# Patient Record
Sex: MALE | Race: WHITE | Employment: OTHER | URBAN - METROPOLITAN AREA
[De-identification: names, ages, dates, MRNs, and addresses within clinical notes are randomized per-mention and may not be internally consistent; named-entity substitution may affect disease eponyms.]

---

## 2018-12-27 ENCOUNTER — HOSPITAL ENCOUNTER (OUTPATIENT)
Dept: LAB | Age: 78
Discharge: HOME | End: 2018-12-27

## 2021-06-04 ENCOUNTER — HOSPITAL ENCOUNTER (EMERGENCY)
Age: 81
Discharge: HOME | End: 2021-06-04
Attending: EMERGENCY MEDICINE
Payer: MEDICARE

## 2021-06-04 ENCOUNTER — APPOINTMENT (OUTPATIENT)
Dept: CT IMAGING | Age: 81
End: 2021-06-04
Attending: EMERGENCY MEDICINE
Payer: MEDICARE

## 2021-06-04 VITALS
RESPIRATION RATE: 15 BRPM | DIASTOLIC BLOOD PRESSURE: 69 MMHG | HEART RATE: 68 BPM | WEIGHT: 174.16 LBS | TEMPERATURE: 97.8 F | HEIGHT: 69 IN | SYSTOLIC BLOOD PRESSURE: 123 MMHG | BODY MASS INDEX: 25.8 KG/M2 | OXYGEN SATURATION: 91 %

## 2021-06-04 DIAGNOSIS — N20.1 URETEROLITHIASIS: Primary | ICD-10-CM

## 2021-06-04 DIAGNOSIS — N13.2 HYDRONEPHROSIS WITH URINARY OBSTRUCTION DUE TO URETERAL CALCULUS: ICD-10-CM

## 2021-06-04 PROCEDURE — 99283 EMERGENCY DEPT VISIT LOW MDM: CPT

## 2021-06-04 PROCEDURE — 74176 CT ABD & PELVIS W/O CONTRAST: CPT

## 2021-06-04 RX ORDER — TAMSULOSIN HYDROCHLORIDE 0.4 MG/1
0.4 CAPSULE ORAL DAILY
Qty: 15 CAPSULE | Refills: 0 | Status: SHIPPED | OUTPATIENT
Start: 2021-06-04 | End: 2021-06-19

## 2021-06-04 RX ORDER — NAPROXEN 500 MG/1
500 TABLET ORAL 2 TIMES DAILY WITH MEALS
Qty: 20 TABLET | Refills: 0 | Status: SHIPPED | OUTPATIENT
Start: 2021-06-04 | End: 2021-06-14

## 2021-06-04 NOTE — ED NOTES
Dr. Kelin Noriega reviewed discharge instructions with the patient. The patient verbalized understanding. Patient ambulated out of the emergency department escorted by family. Patient is free of pain and in no apparent distress.

## 2021-06-04 NOTE — DISCHARGE INSTRUCTIONS
Thank you for allowing us to provide you with medical care today. We realize that you have many choices for your emergency care needs. We thank you for choosing [facility]. Please choose us in the future for any continued health care needs. We hope we addressed all of your medical concerns. We strive to provide excellent quality care in the Emergency Department. Anything less than excellent does not meet our expectations. The exam and treatment you received in the Emergency Department were for an emergent problem and are not intended as complete care. It is important that you follow up with a doctor, nurse practitioner, or physician's assistant for ongoing care. If your symptoms worsen or you do not improve as expected and you are unable to reach your usual health care provider, you should return to the Emergency Department. We are available 24 hours a day. Take this sheet with you when you go to your follow-up visit. If you have any problem arranging the follow-up visit, contact the Emergency Department immediately. Make an appointment your family doctor for follow up of this visit. Return to the ER if you are unable to be seen in a timely manner.

## 2021-06-04 NOTE — ED TRIAGE NOTES
Patient presents to the emergency department reporting he was seen at [facility] and diagnosed with a kidney stone. Patient reports symptoms are similar to previous kidney stones. Patient reports he had right sided flank pain. Patient states they took an x-ray, but the x-ray was not clear due to stool, and was told to come to the ED for a CT. Patient is currently free of pain because he had pain and nausea medication at [facility].  [facility] sent results of urine and blood work.

## 2021-06-04 NOTE — ED PROVIDER NOTES
44-year-old gentleman with history of kidney stones, hypertension presents to the emergency department today with chief complaint of right flank pain. He was seen at [facility] earlier today for index kidney stone pain and given Toradol and Zofran with resolution of his symptoms. He was advised to come to the emergency department as they were unable to see kidney stone on x-ray. He denies any dysuria or fevers. He presents with lab work from [facility] which is remarkable for creatinine of 1.3 with UA with hematuria without signs of infection. The history is provided by the patient and medical records. Flank Pain   This is a new problem. Episode onset: Today. The problem has been resolved. The problem occurs constantly. Patient reports not work related injury. The pain is present in the right side. The pain is moderate. Pertinent negatives include no chest pain, no fever, no headaches, no abdominal pain, no dysuria and no weakness. He has tried NSAIDs for the symptoms. Past Medical History:   Diagnosis Date    Arthritis     Hypertension     Kidney stones        Past Surgical History:   Procedure Laterality Date    HX MITRAL VALVE REPLACEMENT           No family history on file.     Social History     Socioeconomic History    Marital status: Not on file     Spouse name: Not on file    Number of children: Not on file    Years of education: Not on file    Highest education level: Not on file   Occupational History    Not on file   Tobacco Use    Smoking status: Former Smoker    Smokeless tobacco: Never Used   Substance and Sexual Activity    Alcohol use: Not on file    Drug use: Not on file    Sexual activity: Not on file   Other Topics Concern    Not on file   Social History Narrative    Not on file     Social Determinants of Health     Financial Resource Strain:     Difficulty of Paying Living Expenses:    Food Insecurity:     Worried About Running Out of Food in the Last Year:  Ran Out of Food in the Last Year:    Transportation Needs:     Lack of Transportation (Medical):  Lack of Transportation (Non-Medical):    Physical Activity:     Days of Exercise per Week:     Minutes of Exercise per Session:    Stress:     Feeling of Stress :    Social Connections:     Frequency of Communication with Friends and Family:     Frequency of Social Gatherings with Friends and Family:     Attends Islam Services:     Active Member of Clubs or Organizations:     Attends Club or Organization Meetings:     Marital Status:    Intimate Partner Violence:     Fear of Current or Ex-Partner:     Emotionally Abused:     Physically Abused:     Sexually Abused: ALLERGIES: Sulfa (sulfonamide antibiotics)    Review of Systems   Constitutional: Negative for fatigue and fever. HENT: Negative for sneezing and sore throat. Respiratory: Negative for cough and shortness of breath. Cardiovascular: Negative for chest pain and leg swelling. Gastrointestinal: Negative for abdominal pain, diarrhea, nausea and vomiting. Genitourinary: Positive for flank pain. Negative for difficulty urinating and dysuria. Musculoskeletal: Negative for arthralgias and myalgias. Skin: Negative for color change and rash. Neurological: Negative for weakness and headaches. Psychiatric/Behavioral: Negative for agitation and behavioral problems. Vitals:    06/04/21 1637   BP: 134/82   Pulse: 68   Resp: 15   Temp: 97.8 °F (36.6 °C)   SpO2: 94%   Weight: 79 kg (174 lb 2.6 oz)   Height: 5' 9\" (1.753 m)            Physical Exam  Vitals and nursing note reviewed. Constitutional:       General: He is not in acute distress. Appearance: Normal appearance. He is well-developed. He is not ill-appearing, toxic-appearing or diaphoretic. HENT:      Head: Normocephalic and atraumatic. Nose: Nose normal.      Mouth/Throat:      Mouth: Mucous membranes are moist.      Pharynx: Oropharynx is clear. Eyes:      Extraocular Movements: Extraocular movements intact. Conjunctiva/sclera: Conjunctivae normal.      Pupils: Pupils are equal, round, and reactive to light. Cardiovascular:      Rate and Rhythm: Normal rate and regular rhythm. Pulses: Normal pulses. Heart sounds: No murmur heard. Pulmonary:      Effort: Pulmonary effort is normal. No respiratory distress. Breath sounds: Normal breath sounds. No wheezing. Chest:      Chest wall: No mass or tenderness. Abdominal:      General: There is no distension. Palpations: Abdomen is soft. Tenderness: There is no abdominal tenderness. There is no guarding or rebound. Musculoskeletal:         General: No swelling, tenderness, deformity or signs of injury. Normal range of motion. Cervical back: Normal range of motion and neck supple. No rigidity. No muscular tenderness. Right lower leg: No tenderness. No edema. Left lower leg: No tenderness. No edema. Skin:     General: Skin is warm and dry. Capillary Refill: Capillary refill takes less than 2 seconds. Neurological:      General: No focal deficit present. Mental Status: He is alert and oriented to person, place, and time. Psychiatric:         Mood and Affect: Mood normal.         Behavior: Behavior normal.          MDM  Number of Diagnoses or Management Options  Diagnosis management comments: 78-year-old gentleman presents as above with kidney stone. Labs from [facility] are reassuring. Plan to discharge with anti-inflammatory, Flomax, follow-up with [facility] urology as scheduled on Monday.        Amount and/or Complexity of Data Reviewed  Tests in the radiology section of CPT®: reviewed           Procedures

## 2023-10-04 ENCOUNTER — HOSPITAL ENCOUNTER (EMERGENCY)
Facility: HOSPITAL | Age: 83
Discharge: HOME | End: 2023-10-04
Attending: STUDENT IN AN ORGANIZED HEALTH CARE EDUCATION/TRAINING PROGRAM
Payer: MEDICARE

## 2023-10-04 ENCOUNTER — APPOINTMENT (OUTPATIENT)
Facility: HOSPITAL | Age: 83
End: 2023-10-04
Payer: MEDICARE

## 2023-10-04 VITALS
DIASTOLIC BLOOD PRESSURE: 78 MMHG | WEIGHT: 176.81 LBS | HEART RATE: 72 BPM | OXYGEN SATURATION: 92 % | TEMPERATURE: 98.6 F | SYSTOLIC BLOOD PRESSURE: 135 MMHG | RESPIRATION RATE: 14 BRPM | BODY MASS INDEX: 26.11 KG/M2

## 2023-10-04 DIAGNOSIS — R30.0 DYSURIA: Primary | ICD-10-CM

## 2023-10-04 DIAGNOSIS — K57.92 DIVERTICULITIS: ICD-10-CM

## 2023-10-04 LAB
ALBUMIN SERPL-MCNC: 3.6 G/DL (ref 3.5–5)
ALBUMIN/GLOB SERPL: 1 (ref 1.1–2.2)
ALP SERPL-CCNC: 59 U/L (ref 45–117)
ALT SERPL-CCNC: 30 U/L (ref 12–78)
ANION GAP SERPL CALC-SCNC: 11 MMOL/L (ref 5–15)
APPEARANCE UR: CLEAR
AST SERPL-CCNC: 16 U/L (ref 15–37)
BACTERIA URNS QL MICRO: NEGATIVE /HPF
BASOPHILS # BLD: 0 K/UL (ref 0–0.1)
BASOPHILS NFR BLD: 0 % (ref 0–1)
BILIRUB SERPL-MCNC: 1.4 MG/DL (ref 0.2–1)
BILIRUB UR QL: NEGATIVE
BUN SERPL-MCNC: 14 MG/DL (ref 6–20)
BUN/CREAT SERPL: 16 (ref 12–20)
CALCIUM SERPL-MCNC: 9.2 MG/DL (ref 8.5–10.1)
CHLORIDE SERPL-SCNC: 104 MMOL/L (ref 97–108)
CO2 SERPL-SCNC: 25 MMOL/L (ref 21–32)
COLOR UR: ABNORMAL
CREAT SERPL-MCNC: 0.88 MG/DL (ref 0.7–1.3)
DIFFERENTIAL METHOD BLD: ABNORMAL
EOSINOPHIL # BLD: 0.1 K/UL (ref 0–0.4)
EOSINOPHIL NFR BLD: 1 % (ref 0–7)
EPITH CASTS URNS QL MICRO: ABNORMAL /LPF
ERYTHROCYTE [DISTWIDTH] IN BLOOD BY AUTOMATED COUNT: 15.4 % (ref 11.5–14.5)
GLOBULIN SER CALC-MCNC: 3.5 G/DL (ref 2–4)
GLUCOSE SERPL-MCNC: 90 MG/DL (ref 65–100)
GLUCOSE UR STRIP.AUTO-MCNC: NEGATIVE MG/DL
HCT VFR BLD AUTO: 42.5 % (ref 36.6–50.3)
HGB BLD-MCNC: 14.2 G/DL (ref 12.1–17)
HGB UR QL STRIP: ABNORMAL
IMM GRANULOCYTES # BLD AUTO: 0 K/UL
IMM GRANULOCYTES NFR BLD AUTO: 0 %
KETONES UR QL STRIP.AUTO: 40 MG/DL
LEUKOCYTE ESTERASE UR QL STRIP.AUTO: NEGATIVE
LIPASE SERPL-CCNC: 25 U/L (ref 13–75)
LYMPHOCYTES # BLD: 0.8 K/UL (ref 0.8–3.5)
LYMPHOCYTES NFR BLD: 9 % (ref 12–49)
MCH RBC QN AUTO: 32.1 PG (ref 26–34)
MCHC RBC AUTO-ENTMCNC: 33.4 G/DL (ref 30–36.5)
MCV RBC AUTO: 96.2 FL (ref 80–99)
MONOCYTES # BLD: 0.8 K/UL (ref 0–1)
MONOCYTES NFR BLD: 9 % (ref 5–13)
NEUTS SEG # BLD: 6.8 K/UL (ref 1.8–8)
NEUTS SEG NFR BLD: 81 % (ref 32–75)
NITRITE UR QL STRIP.AUTO: NEGATIVE
NRBC # BLD: 0 K/UL (ref 0–0.01)
NRBC BLD-RTO: 0 PER 100 WBC
PH UR STRIP: 6.5 (ref 5–8)
PLATELET # BLD AUTO: 177 K/UL (ref 150–400)
PMV BLD AUTO: 9.5 FL (ref 8.9–12.9)
POTASSIUM SERPL-SCNC: 4 MMOL/L (ref 3.5–5.1)
PROT SERPL-MCNC: 7.1 G/DL (ref 6.4–8.2)
PROT UR STRIP-MCNC: NEGATIVE MG/DL
RBC # BLD AUTO: 4.42 M/UL (ref 4.1–5.7)
RBC #/AREA URNS HPF: ABNORMAL /HPF (ref 0–5)
RBC MORPH BLD: ABNORMAL
SODIUM SERPL-SCNC: 140 MMOL/L (ref 136–145)
SP GR UR REFRACTOMETRY: 1.01 (ref 1–1.03)
SPECIMEN HOLD: NORMAL
UROBILINOGEN UR QL STRIP.AUTO: 0.2 EU/DL (ref 0.2–1)
WBC # BLD AUTO: 8.5 K/UL (ref 4.1–11.1)
WBC URNS QL MICRO: ABNORMAL /HPF (ref 0–4)

## 2023-10-04 PROCEDURE — 80053 COMPREHEN METABOLIC PANEL: CPT

## 2023-10-04 PROCEDURE — 85025 COMPLETE CBC W/AUTO DIFF WBC: CPT

## 2023-10-04 PROCEDURE — 81001 URINALYSIS AUTO W/SCOPE: CPT

## 2023-10-04 PROCEDURE — 99284 EMERGENCY DEPT VISIT MOD MDM: CPT

## 2023-10-04 PROCEDURE — 36415 COLL VENOUS BLD VENIPUNCTURE: CPT

## 2023-10-04 PROCEDURE — 83690 ASSAY OF LIPASE: CPT

## 2023-10-04 PROCEDURE — 74176 CT ABD & PELVIS W/O CONTRAST: CPT

## 2023-10-04 RX ORDER — SILDENAFIL 25 MG/1
25 TABLET, FILM COATED ORAL DAILY PRN
COMMUNITY
Start: 2022-12-02

## 2023-10-04 RX ORDER — OCTISALATE, AVOBENZONE, HOMOSALATE, AND OCTOCRYLENE 29.4; 29.4; 49; 25.48 MG/ML; MG/ML; MG/ML; MG/ML
LOTION TOPICAL
COMMUNITY

## 2023-10-04 RX ORDER — ACETAMINOPHEN 325 MG/1
TABLET ORAL
COMMUNITY

## 2023-10-04 RX ORDER — LATANOPROST 50 UG/ML
SOLUTION/ DROPS OPHTHALMIC
COMMUNITY
Start: 2023-07-21

## 2023-10-04 RX ORDER — ASPIRIN 81 MG/1
TABLET ORAL
COMMUNITY

## 2023-10-04 RX ORDER — PRAVASTATIN SODIUM 40 MG
40 TABLET ORAL NIGHTLY
Qty: 30 TABLET | Refills: 11 | COMMUNITY
Start: 2023-07-27 | End: 2024-07-26

## 2023-10-04 RX ORDER — CETIRIZINE HYDROCHLORIDE 10 MG/1
TABLET ORAL
COMMUNITY

## 2023-10-04 RX ORDER — METHYLCELLULOSE 2 G/19G
POWDER, FOR SOLUTION ORAL
COMMUNITY

## 2023-10-04 RX ORDER — MULTIVITAMIN WITH IRON
TABLET ORAL
COMMUNITY

## 2023-10-04 RX ORDER — AMLODIPINE BESYLATE 5 MG/1
5 TABLET ORAL DAILY
COMMUNITY
Start: 2023-02-27

## 2023-10-04 RX ORDER — ALLOPURINOL 300 MG/1
300 TABLET ORAL DAILY
Qty: 30 TABLET | Refills: 11 | COMMUNITY
Start: 2022-12-28 | End: 2023-12-28

## 2023-10-04 RX ORDER — AMOXICILLIN AND CLAVULANATE POTASSIUM 875; 125 MG/1; MG/1
1 TABLET, FILM COATED ORAL 2 TIMES DAILY
Qty: 20 TABLET | Refills: 0 | Status: SHIPPED | OUTPATIENT
Start: 2023-10-04 | End: 2023-10-14

## 2023-10-04 RX ORDER — TERAZOSIN 5 MG/1
5 CAPSULE ORAL NIGHTLY
Qty: 30 CAPSULE | Refills: 11 | COMMUNITY
Start: 2022-12-28 | End: 2023-12-28

## 2023-10-04 ASSESSMENT — PAIN SCALES - GENERAL: PAINLEVEL_OUTOF10: 3

## 2023-10-04 ASSESSMENT — PAIN - FUNCTIONAL ASSESSMENT: PAIN_FUNCTIONAL_ASSESSMENT: 0-10

## 2023-10-04 ASSESSMENT — PAIN DESCRIPTION - LOCATION: LOCATION: PENIS

## 2023-10-04 ASSESSMENT — PAIN DESCRIPTION - DESCRIPTORS: DESCRIPTORS: BURNING

## 2023-10-04 NOTE — ED NOTES

## 2023-10-04 NOTE — ED TRIAGE NOTES
81year old male pt comes to the ED via POV for a CC Dysuria and frequency. Pt states that he got up multiple times in the night with pain during urination and after urination. Pt has a history of UTI's and he believes its one. Pt is A&Ox4. Patient in bed in semi fowlers position. Pt educated on call bell and call bell in reach. Nurse told patient to not get up without assistance and use call bell. Wife at the bedside.

## 2024-03-13 ENCOUNTER — APPOINTMENT (OUTPATIENT)
Facility: HOSPITAL | Age: 84
End: 2024-03-13
Payer: MEDICARE

## 2024-03-13 ENCOUNTER — HOSPITAL ENCOUNTER (INPATIENT)
Facility: HOSPITAL | Age: 84
LOS: 2 days | Discharge: HOME | End: 2024-03-15
Attending: STUDENT IN AN ORGANIZED HEALTH CARE EDUCATION/TRAINING PROGRAM | Admitting: STUDENT IN AN ORGANIZED HEALTH CARE EDUCATION/TRAINING PROGRAM
Payer: MEDICARE

## 2024-03-13 DIAGNOSIS — R55 SYNCOPE: ICD-10-CM

## 2024-03-13 DIAGNOSIS — R55 SYNCOPE AND COLLAPSE: Primary | ICD-10-CM

## 2024-03-13 LAB
ALBUMIN SERPL-MCNC: 3.5 G/DL (ref 3.5–5)
ALBUMIN/GLOB SERPL: 0.9 (ref 1.1–2.2)
ALP SERPL-CCNC: 66 U/L (ref 45–117)
ALT SERPL-CCNC: 32 U/L (ref 12–78)
ANION GAP SERPL CALC-SCNC: 11 MMOL/L (ref 5–15)
AST SERPL-CCNC: 26 U/L (ref 15–37)
BASOPHILS # BLD: 0 K/UL (ref 0–0.1)
BASOPHILS NFR BLD: 0 % (ref 0–1)
BILIRUB SERPL-MCNC: 1 MG/DL (ref 0.2–1)
BUN SERPL-MCNC: 18 MG/DL (ref 6–20)
BUN/CREAT SERPL: 23 (ref 12–20)
CALCIUM SERPL-MCNC: 8.9 MG/DL (ref 8.5–10.1)
CHLORIDE SERPL-SCNC: 104 MMOL/L (ref 97–108)
CO2 SERPL-SCNC: 24 MMOL/L (ref 21–32)
CREAT SERPL-MCNC: 0.8 MG/DL (ref 0.7–1.3)
DIFFERENTIAL METHOD BLD: ABNORMAL
EKG ATRIAL RATE: 46 BPM
EKG DIAGNOSIS: NORMAL
EKG Q-T INTERVAL: 398 MS
EKG QRS DURATION: 80 MS
EKG QTC CALCULATION (BAZETT): 403 MS
EKG R AXIS: 13 DEGREES
EKG T AXIS: 32 DEGREES
EKG VENTRICULAR RATE: 62 BPM
EOSINOPHIL # BLD: 0.2 K/UL (ref 0–0.4)
EOSINOPHIL NFR BLD: 3 % (ref 0–7)
ERYTHROCYTE [DISTWIDTH] IN BLOOD BY AUTOMATED COUNT: 15.4 % (ref 11.5–14.5)
GLOBULIN SER CALC-MCNC: 3.7 G/DL (ref 2–4)
GLUCOSE SERPL-MCNC: 92 MG/DL (ref 65–100)
HCT VFR BLD AUTO: 44.7 % (ref 36.6–50.3)
HGB BLD-MCNC: 15 G/DL (ref 12.1–17)
IMM GRANULOCYTES # BLD AUTO: 0 K/UL (ref 0–0.04)
IMM GRANULOCYTES NFR BLD AUTO: 0 % (ref 0–0.5)
LYMPHOCYTES # BLD: 0.9 K/UL (ref 0.8–3.5)
LYMPHOCYTES NFR BLD: 16 % (ref 12–49)
MAGNESIUM SERPL-MCNC: 2 MG/DL (ref 1.6–2.4)
MCH RBC QN AUTO: 31.3 PG (ref 26–34)
MCHC RBC AUTO-ENTMCNC: 33.6 G/DL (ref 30–36.5)
MCV RBC AUTO: 93.1 FL (ref 80–99)
MONOCYTES # BLD: 0.5 K/UL (ref 0–1)
MONOCYTES NFR BLD: 9 % (ref 5–13)
NEUTS SEG # BLD: 3.9 K/UL (ref 1.8–8)
NEUTS SEG NFR BLD: 72 % (ref 32–75)
NRBC # BLD: 0 K/UL (ref 0–0.01)
NRBC BLD-RTO: 0 PER 100 WBC
NT PRO BNP: 96 PG/ML (ref 0–450)
PHOSPHATE SERPL-MCNC: 3.6 MG/DL (ref 2.6–4.7)
PLATELET # BLD AUTO: 185 K/UL (ref 150–400)
PMV BLD AUTO: 9.5 FL (ref 8.9–12.9)
POTASSIUM SERPL-SCNC: 5 MMOL/L (ref 3.5–5.1)
PROT SERPL-MCNC: 7.2 G/DL (ref 6.4–8.2)
RBC # BLD AUTO: 4.8 M/UL (ref 4.1–5.7)
RBC MORPH BLD: ABNORMAL
SODIUM SERPL-SCNC: 139 MMOL/L (ref 136–145)
TROPONIN I SERPL HS-MCNC: 5 NG/L (ref 0–76)
TROPONIN I SERPL HS-MCNC: 6 NG/L (ref 0–76)
WBC # BLD AUTO: 5.5 K/UL (ref 4.1–11.1)

## 2024-03-13 PROCEDURE — 84484 ASSAY OF TROPONIN QUANT: CPT

## 2024-03-13 PROCEDURE — 83880 ASSAY OF NATRIURETIC PEPTIDE: CPT

## 2024-03-13 PROCEDURE — 93005 ELECTROCARDIOGRAM TRACING: CPT | Performed by: STUDENT IN AN ORGANIZED HEALTH CARE EDUCATION/TRAINING PROGRAM

## 2024-03-13 PROCEDURE — 84100 ASSAY OF PHOSPHORUS: CPT

## 2024-03-13 PROCEDURE — 70450 CT HEAD/BRAIN W/O DYE: CPT

## 2024-03-13 PROCEDURE — 83735 ASSAY OF MAGNESIUM: CPT

## 2024-03-13 PROCEDURE — 99285 EMERGENCY DEPT VISIT HI MDM: CPT

## 2024-03-13 PROCEDURE — 36415 COLL VENOUS BLD VENIPUNCTURE: CPT

## 2024-03-13 PROCEDURE — 71045 X-RAY EXAM CHEST 1 VIEW: CPT

## 2024-03-13 PROCEDURE — 85025 COMPLETE CBC W/AUTO DIFF WBC: CPT

## 2024-03-13 PROCEDURE — 93010 ELECTROCARDIOGRAM REPORT: CPT | Performed by: INTERNAL MEDICINE

## 2024-03-13 PROCEDURE — 80053 COMPREHEN METABOLIC PANEL: CPT

## 2024-03-13 PROCEDURE — 2060000000 HC ICU INTERMEDIATE R&B

## 2024-03-13 RX ORDER — DOXAZOSIN 2 MG/1
4 TABLET ORAL DAILY
Status: DISCONTINUED | OUTPATIENT
Start: 2024-03-14 | End: 2024-03-15 | Stop reason: HOSPADM

## 2024-03-13 RX ORDER — SODIUM CHLORIDE 9 MG/ML
INJECTION, SOLUTION INTRAVENOUS CONTINUOUS
Status: DISCONTINUED | OUTPATIENT
Start: 2024-03-13 | End: 2024-03-15 | Stop reason: HOSPADM

## 2024-03-13 RX ORDER — ALLOPURINOL 100 MG/1
300 TABLET ORAL DAILY
Status: DISCONTINUED | OUTPATIENT
Start: 2024-03-14 | End: 2024-03-15 | Stop reason: HOSPADM

## 2024-03-13 RX ORDER — ENOXAPARIN SODIUM 100 MG/ML
40 INJECTION SUBCUTANEOUS DAILY
Status: DISCONTINUED | OUTPATIENT
Start: 2024-03-14 | End: 2024-03-15 | Stop reason: HOSPADM

## 2024-03-13 RX ORDER — PRAVASTATIN SODIUM 20 MG
40 TABLET ORAL NIGHTLY
Status: DISCONTINUED | OUTPATIENT
Start: 2024-03-13 | End: 2024-03-15 | Stop reason: HOSPADM

## 2024-03-13 RX ORDER — SODIUM CHLORIDE 0.9 % (FLUSH) 0.9 %
5-40 SYRINGE (ML) INJECTION PRN
Status: DISCONTINUED | OUTPATIENT
Start: 2024-03-13 | End: 2024-03-15 | Stop reason: HOSPADM

## 2024-03-13 RX ORDER — ACETAMINOPHEN 650 MG/1
650 SUPPOSITORY RECTAL EVERY 6 HOURS PRN
Status: DISCONTINUED | OUTPATIENT
Start: 2024-03-13 | End: 2024-03-15 | Stop reason: HOSPADM

## 2024-03-13 RX ORDER — SODIUM CHLORIDE 9 MG/ML
INJECTION, SOLUTION INTRAVENOUS PRN
Status: DISCONTINUED | OUTPATIENT
Start: 2024-03-13 | End: 2024-03-15 | Stop reason: HOSPADM

## 2024-03-13 RX ORDER — ALLOPURINOL 300 MG/1
300 TABLET ORAL DAILY
Status: ON HOLD | COMMUNITY
End: 2024-03-15 | Stop reason: HOSPADM

## 2024-03-13 RX ORDER — ONDANSETRON 2 MG/ML
4 INJECTION INTRAMUSCULAR; INTRAVENOUS EVERY 6 HOURS PRN
Status: DISCONTINUED | OUTPATIENT
Start: 2024-03-13 | End: 2024-03-15 | Stop reason: HOSPADM

## 2024-03-13 RX ORDER — ACETAMINOPHEN 325 MG/1
650 TABLET ORAL EVERY 6 HOURS PRN
Status: DISCONTINUED | OUTPATIENT
Start: 2024-03-13 | End: 2024-03-15 | Stop reason: HOSPADM

## 2024-03-13 RX ORDER — MAGNESIUM SULFATE IN WATER 40 MG/ML
2000 INJECTION, SOLUTION INTRAVENOUS PRN
Status: DISCONTINUED | OUTPATIENT
Start: 2024-03-13 | End: 2024-03-15 | Stop reason: HOSPADM

## 2024-03-13 RX ORDER — POTASSIUM CHLORIDE 750 MG/1
40 TABLET, FILM COATED, EXTENDED RELEASE ORAL PRN
Status: DISCONTINUED | OUTPATIENT
Start: 2024-03-13 | End: 2024-03-15 | Stop reason: HOSPADM

## 2024-03-13 RX ORDER — SODIUM CHLORIDE 0.9 % (FLUSH) 0.9 %
5-40 SYRINGE (ML) INJECTION EVERY 12 HOURS SCHEDULED
Status: DISCONTINUED | OUTPATIENT
Start: 2024-03-13 | End: 2024-03-15 | Stop reason: HOSPADM

## 2024-03-13 RX ORDER — LATANOPROST 50 UG/ML
1 SOLUTION/ DROPS OPHTHALMIC NIGHTLY
Status: DISCONTINUED | OUTPATIENT
Start: 2024-03-13 | End: 2024-03-15 | Stop reason: HOSPADM

## 2024-03-13 RX ORDER — POLYETHYLENE GLYCOL 3350 17 G/17G
17 POWDER, FOR SOLUTION ORAL DAILY PRN
Status: DISCONTINUED | OUTPATIENT
Start: 2024-03-13 | End: 2024-03-15 | Stop reason: HOSPADM

## 2024-03-13 RX ORDER — ONDANSETRON 4 MG/1
4 TABLET, ORALLY DISINTEGRATING ORAL EVERY 8 HOURS PRN
Status: DISCONTINUED | OUTPATIENT
Start: 2024-03-13 | End: 2024-03-15 | Stop reason: HOSPADM

## 2024-03-13 RX ORDER — POTASSIUM CHLORIDE 7.45 MG/ML
10 INJECTION INTRAVENOUS PRN
Status: DISCONTINUED | OUTPATIENT
Start: 2024-03-13 | End: 2024-03-15 | Stop reason: HOSPADM

## 2024-03-13 RX ORDER — ASPIRIN 81 MG/1
81 TABLET ORAL DAILY
Status: DISCONTINUED | OUTPATIENT
Start: 2024-03-14 | End: 2024-03-15 | Stop reason: HOSPADM

## 2024-03-13 ASSESSMENT — LIFESTYLE VARIABLES
HOW OFTEN DO YOU HAVE A DRINK CONTAINING ALCOHOL: 2-3 TIMES A WEEK
HOW MANY STANDARD DRINKS CONTAINING ALCOHOL DO YOU HAVE ON A TYPICAL DAY: 1 OR 2

## 2024-03-13 ASSESSMENT — PAIN - FUNCTIONAL ASSESSMENT: PAIN_FUNCTIONAL_ASSESSMENT: NONE - DENIES PAIN

## 2024-03-13 NOTE — ED NOTES
Pt was not accepted to [facility], MD aware.  Asked MD to update pt. Pt resting comfortably, and was informed that the MD was working on helping the wheels to turn in their process.

## 2024-03-13 NOTE — ED NOTES
Bedside report from off-going nurse. Pt stable at time of care transfer. Pt declined any needs at this time. Troponin redrawn per orders. Pt updated on plan.

## 2024-03-13 NOTE — ED NOTES
Pt resting comfortably, requested to get OOB to go to BR.  Pt tolerated well.  Pt asked if he could sit in chair since his bottom was getting sore.  Pt was set up so he could sit in chair while maintaining connected to monitoring devices. Waiting to determine if pt will be accepted to [facility].

## 2024-03-13 NOTE — ED PROVIDER NOTES
[facility] EMERGENCY CTR  EMERGENCY DEPARTMENT ENCOUNTER      Pt Name: Cesario Chase  MRN: [id number]  Birthdate 1940  Date of evaluation: 3/13/2024  Provider: Nathalia Chavez MD    CHIEF COMPLAINT       Chief Complaint   Patient presents with    Loss of Consciousness         HISTORY OF PRESENT ILLNESS    Review of Medical Records: N/A    Nursing Triage Notes were reviewed.    HPI    Cesario Chase is a 83 y.o. male with a history of paroxysmal atrial fibrillation, hypertension, prior mitral valve repair with maze procedure who presents to the emergency department for evaluation of syncopal episode.  Patient reports that he felt unwell last night with general malaise.  This continued this morning along with new fatigue.  Reports he and his wife went out to a restaurant with their son.  Patient was just sitting at the table when he suddenly lost consciousness for 30 seconds per wife.  Wife denies any confusion afterwards or jerking muscle movements during this episode.  Patient reports he follows with Dr. Jack with [facility].  Was started on a Holter monitor approximately 1.5 weeks ago to monitor him for atrial fibrillation.        PAST MEDICAL HISTORY     Past Medical History:   Diagnosis Date    Arthritis     Hypertension     Kidney stones          SURGICAL HISTORY       Past Surgical History:   Procedure Laterality Date    MITRAL VALVE REPLACEMENT           CURRENT MEDICATIONS       Previous Medications    ACETAMINOPHEN (TYLENOL) 325 MG TABLET        ALLOPURINOL (ZYLOPRIM) 300 MG TABLET    Take 1 tablet by mouth daily    AMLODIPINE (NORVASC) 5 MG TABLET    Take 1 tablet by mouth daily    ASPIRIN 81 MG EC TABLET        CETIRIZINE (ZYRTEC) 10 MG TABLET        LATANOPROST (XALATAN) 0.005 % OPHTHALMIC SOLUTION    INSTILL 1 DROP INTO BOTH  EYES AT BEDTIME    MAGNESIUM (MAGNESIUM-OXIDE) 250 MG TABS TABLET        METHYLCELLULOSE (CITRUCEL) ORAL POWDER        PRAVASTATIN (PRAVACHOL) 40 MG TABLET    Take 1

## 2024-03-13 NOTE — ED TRIAGE NOTES
Pt had a syncopal episode and pt has been feeling dizzy since.  Pt was sitting at the table right before the episode.  Pt still feels dizzy. Denies any CP.  Pt states never had this before.  Pt had COVID x3 weeks ago but no other illness.  Pt has a heart monitor on from his cardiologist because he has been going in and out of afib. Pt states he can't tell when he goes in and out of afib.  Pt denies blood thinners, but does take baby aspirin

## 2024-03-13 NOTE — ED PROVIDER NOTES
ED SIGN OUT NOTE  Care assumed at [facility] 5:18 PM EDT    Patient was signed out to me by Dr. Chavez.     Patient is awaiting acceptance by [facility].    /73   Pulse 57   Temp 97.6 °F (36.4 °C) (Oral)   Resp 18   Ht 1.753 m (5' 9\")   Wt 76.2 kg (168 lb)   SpO2 97%   BMI 24.81 kg/m²     Labs Reviewed   CBC WITH AUTO DIFFERENTIAL - Abnormal; Notable for the following components:       Result Value    RDW 15.4 (*)     All other components within normal limits   COMPREHENSIVE METABOLIC PANEL - Abnormal; Notable for the following components:    Bun/Cre Ratio 23 (*)     Albumin/Globulin Ratio 0.9 (*)     All other components within normal limits   MAGNESIUM   PHOSPHORUS   BRAIN NATRIURETIC PEPTIDE   TROPONIN   TROPONIN   EXTRA TUBES HOLD     CT Head W/O Contrast   Final Result   No acute intracranial process identified.            XR CHEST PORTABLE   Final Result   1. No acute disease              ED Course as of 03/13/24 1718   Wed Mar 13, 2024   1458 CT Head W/O Contrast [LT]   1458 XR CHEST PORTABLE [LT]   1458 Troponin, High Sensitivity: 6 [LT]   1532 EKG documented and interpreted by Nathalia Chavez MD as: Atrial fibrillation, HR approximately 60, irregular rate, normal axis, no ST segment elevation   [LT]   1643 Discussed with Dr. Natalia Rizzo with [facility], agree with plan for inpatient admission for further workup, cardiology consultation, access of holter monitor records, however no bed currently available. Working to get an estimate on possible time of bed availability. Will call back with estimate shortly.  [LT]      ED Course User Index  [LT] Nathalia Chavez MD       Diagnosis:   1. Syncope and collapse        Disposition:   Decision To Admit 03/13/2024 05:18:04 PM  [facility] declined the transfer due to capacity. Patient is being admitted to [facility].  The results of their tests and reasons for their admission have been discussed with them and/or available

## 2024-03-13 NOTE — ED NOTES
Pt resting comfortably. Wife has gone home to get pt belongings.  Pt is clear on plan that he is going to be admitted to [facility].

## 2024-03-14 ENCOUNTER — APPOINTMENT (OUTPATIENT)
Facility: HOSPITAL | Age: 84
End: 2024-03-14
Attending: STUDENT IN AN ORGANIZED HEALTH CARE EDUCATION/TRAINING PROGRAM
Payer: MEDICARE

## 2024-03-14 LAB
ANION GAP SERPL CALC-SCNC: 5 MMOL/L (ref 5–15)
BUN SERPL-MCNC: 22 MG/DL (ref 6–20)
BUN/CREAT SERPL: 27 (ref 12–20)
CALCIUM SERPL-MCNC: 8.8 MG/DL (ref 8.5–10.1)
CHLORIDE SERPL-SCNC: 111 MMOL/L (ref 97–108)
CO2 SERPL-SCNC: 24 MMOL/L (ref 21–32)
CREAT SERPL-MCNC: 0.83 MG/DL (ref 0.7–1.3)
GLUCOSE SERPL-MCNC: 95 MG/DL (ref 65–100)
POTASSIUM SERPL-SCNC: 4.1 MMOL/L (ref 3.5–5.1)
SODIUM SERPL-SCNC: 140 MMOL/L (ref 136–145)
TSH SERPL DL<=0.05 MIU/L-ACNC: 2.28 UIU/ML (ref 0.36–3.74)

## 2024-03-14 PROCEDURE — 97165 OT EVAL LOW COMPLEX 30 MIN: CPT

## 2024-03-14 PROCEDURE — 2580000003 HC RX 258: Performed by: STUDENT IN AN ORGANIZED HEALTH CARE EDUCATION/TRAINING PROGRAM

## 2024-03-14 PROCEDURE — 80048 BASIC METABOLIC PNL TOTAL CA: CPT

## 2024-03-14 PROCEDURE — 97116 GAIT TRAINING THERAPY: CPT

## 2024-03-14 PROCEDURE — 36415 COLL VENOUS BLD VENIPUNCTURE: CPT

## 2024-03-14 PROCEDURE — 97161 PT EVAL LOW COMPLEX 20 MIN: CPT

## 2024-03-14 PROCEDURE — 99223 1ST HOSP IP/OBS HIGH 75: CPT | Performed by: INTERNAL MEDICINE

## 2024-03-14 PROCEDURE — 93880 EXTRACRANIAL BILAT STUDY: CPT

## 2024-03-14 PROCEDURE — 2060000000 HC ICU INTERMEDIATE R&B

## 2024-03-14 PROCEDURE — 84443 ASSAY THYROID STIM HORMONE: CPT

## 2024-03-14 PROCEDURE — 97530 THERAPEUTIC ACTIVITIES: CPT

## 2024-03-14 PROCEDURE — 6370000000 HC RX 637 (ALT 250 FOR IP): Performed by: STUDENT IN AN ORGANIZED HEALTH CARE EDUCATION/TRAINING PROGRAM

## 2024-03-14 PROCEDURE — 6360000002 HC RX W HCPCS: Performed by: STUDENT IN AN ORGANIZED HEALTH CARE EDUCATION/TRAINING PROGRAM

## 2024-03-14 RX ORDER — SODIUM CHLORIDE 9 MG/ML
INJECTION, SOLUTION INTRAVENOUS PRN
Status: DISCONTINUED | OUTPATIENT
Start: 2024-03-14 | End: 2024-03-15 | Stop reason: HOSPADM

## 2024-03-14 RX ORDER — SODIUM CHLORIDE 0.9 % (FLUSH) 0.9 %
5-40 SYRINGE (ML) INJECTION EVERY 12 HOURS SCHEDULED
Status: DISCONTINUED | OUTPATIENT
Start: 2024-03-14 | End: 2024-03-15 | Stop reason: HOSPADM

## 2024-03-14 RX ORDER — CHLORHEXIDINE GLUCONATE 40 MG/ML
SOLUTION TOPICAL ONCE
Status: DISCONTINUED | OUTPATIENT
Start: 2024-03-14 | End: 2024-03-15 | Stop reason: HOSPADM

## 2024-03-14 RX ORDER — SODIUM CHLORIDE 0.9 % (FLUSH) 0.9 %
5-40 SYRINGE (ML) INJECTION PRN
Status: DISCONTINUED | OUTPATIENT
Start: 2024-03-14 | End: 2024-03-15 | Stop reason: HOSPADM

## 2024-03-14 RX ADMIN — POLYETHYLENE GLYCOL 3350 17 G: 17 POWDER, FOR SOLUTION ORAL at 11:00

## 2024-03-14 RX ADMIN — SODIUM CHLORIDE: 9 INJECTION, SOLUTION INTRAVENOUS at 10:38

## 2024-03-14 RX ADMIN — ENOXAPARIN SODIUM 40 MG: 100 INJECTION SUBCUTANEOUS at 08:07

## 2024-03-14 RX ADMIN — ALLOPURINOL 300 MG: 100 TABLET ORAL at 08:07

## 2024-03-14 RX ADMIN — PRAVASTATIN SODIUM 40 MG: 20 TABLET ORAL at 21:01

## 2024-03-14 RX ADMIN — ASPIRIN 81 MG: 81 TABLET, COATED ORAL at 08:07

## 2024-03-14 RX ADMIN — SODIUM CHLORIDE: 9 INJECTION, SOLUTION INTRAVENOUS at 19:10

## 2024-03-14 RX ADMIN — LATANOPROST 1 DROP: 50 SOLUTION OPHTHALMIC at 21:02

## 2024-03-14 RX ADMIN — SODIUM CHLORIDE: 9 INJECTION, SOLUTION INTRAVENOUS at 00:49

## 2024-03-14 RX ADMIN — SODIUM CHLORIDE, PRESERVATIVE FREE 10 ML: 5 INJECTION INTRAVENOUS at 08:08

## 2024-03-14 ASSESSMENT — PAIN SCALES - GENERAL
PAINLEVEL_OUTOF10: 0
PAINLEVEL_OUTOF10: 0

## 2024-03-14 NOTE — PROGRESS NOTES
Hospitalist Progress Note  Michael Malloy MD  Answering service: [phone]        Date of Service:  3/14/2024  NAME:  Cesario Chase  :  1940  MRN:  [id number]      Admission Summary:     Patient admitted with syncope.    Interval history / Subjective:          Assessment & Plan:     Syncope  -So far workup includes CT head which was unremarkable, EKG shows normal sinus rhythm with occasional PACs, nonspecific ST abnormality  -Noted sinus pauses on cardiac monitor strip from [place]  -Cardiology planning for pacemaker placement in a.m.  -Carotid Doppler and echo pending, cancel MRI    Paroxysmal atrial fibrillation  -S/p left atrial appendage closure  -Stable, not on anticoagulation    Dyslipidemia  -Continue statin    Gout  -Continue allopurinol    BPH  -Continue terazosin     Code status: Full  Prophylaxis: Lovenox  Care Plan discussed with: Patient  Anticipated Disposition: Plan for pacemaker placement in a.m., plan for discharge thereafter.  Central Line:   None             Review of Systems:   Pertinent items are noted in HPI.         Vital Signs:    Last 24hrs VS reviewed since prior progress note. Most recent are:  Vitals:    24 0649   BP: 115/64   Pulse: 68   Resp: 13   Temp: 98.6 °F (37 °C)   SpO2: 94%         Intake/Output Summary (Last 24 hours) at 3/14/2024 0902  Last data filed at 3/14/2024 0603  Gross per 24 hour   Intake --   Output 800 ml   Net -800 ml        Physical Examination:     I had a face to face encounter with this patient and independently examined them on 3/14/2024 as outlined below:          General : alert x 3, awake, no acute distress,   HEENT: PEERL, EOMI, moist mucus membrane, TM clear  Neck: supple, no JVD, no meningeal signs  Chest: Clear to auscultation bilaterally   CVS: S1 S2 heard, Capillary refill less than 2 seconds  Abd: soft/ non tender, non distended, BS physiological,

## 2024-03-14 NOTE — ED NOTES
TRANSFER - OUT REPORT:    Verbal report given to BRITT Pink on Cesario Chase  being transferred to BRITT Espino for routine progression of patient care       Report consisted of patient's Situation, Background, Assessment and   Recommendations(SBAR).     Information from the following report(s) Nurse Handoff Report, ED Encounter Summary, Intake/Output, Recent Results, and Cardiac Rhythm SR, Afib  was reviewed with the receiving nurse.    [place] Assessment:    Presents to emergency department  because of falls (Syncope, seizure, or loss of consciousness): Yes  Age > 70: Yes  Altered Mental Status, Intoxication with alcohol or substance confusion (Disorientation, impaired judgment, poor safety awaremess, or inability to follow instructions): No  Impaired Mobility: Ambulates or transfers with assistive devices or assistance; Unable to ambulate or transer.: No  Nursing Judgement: Yes          Lines:   Peripheral IV 03/13/24 Right Antecubital (Active)        Opportunity for questions and clarification was provided.      Patient transported with:  Monitor and Registered Nurse  /87   Pulse 62   Temp 97.8 °F (36.6 °C) (Oral)   Resp 14   Ht 1.753 m (5' 9\")   Wt 76.2 kg (168 lb)   SpO2 96%   BMI 24.81 kg/m²

## 2024-03-14 NOTE — H&P
History & Physical    Primary Care Provider: Nancy Rojas MD  Source of Information: Patient and chart review    History of Presenting Illness:   Cesario Chase is a 83 y.o. male with pmh htn, pAFib s/p Left atrial appendage excision, hx of MVP s/p MAZE, gout, dyslipidemia, bph, glaucoma who presented to [facility] for concerns of syncope. Reports some malaise and fatigue iver the last 2 days. Was sitting at a restaurant whne he reportedly slumped over in his chair. Was breathing but semiconscious only for about 30 secs per family.  Had recent holter monitor from outpatient cardiology to evaluate afib burden.  The patient was seen and examined at bedside.  Complaints concerns.  Denies any fever, chills, chest or abdominal pain, nausea, vomiting, cough, congestion, recent illness, palpitations, or dysuria.    Remarkable vitals on ER Presentation: vss  Labs Remarkable for: unremarkable  ER Images: CT head and CXR: no acute process  ER Rx: none     Review of Systems:  Pertinent items are noted in the History of Present Illness.     Past Medical History:   Diagnosis Date    Arthritis     Hypertension     Kidney stones       Past Surgical History:   Procedure Laterality Date    MITRAL VALVE REPLACEMENT       Prior to Admission medications    Medication Sig Start Date End Date Taking? Authorizing Provider   allopurinol (ZYLOPRIM) 300 MG tablet Take 1 tablet by mouth daily   Yes Isabel Dolan MD   acetaminophen (TYLENOL) 325 MG tablet     Isabel Dolan MD   allopurinol (ZYLOPRIM) 300 MG tablet Take 1 tablet by mouth daily 12/28/22 3/13/24  Isabel Dolan MD   amLODIPine (NORVASC) 5 MG tablet Take 1 tablet by mouth daily  Patient not taking: Reported on 3/13/2024 2/27/23   Isabel Dolan MD   aspirin 81 MG EC tablet     Isabel Dolan MD   cetirizine (ZYRTEC) 10 MG tablet     Isabel Dolan MD   vitamin D 25 MCG (1000 UT) CAPS Take 25 mcg by mouth daily    Isabel Dolan MD  tolerated  DVT prophylaxis - Lovenox  GI prophylaxis -  none indicated  Disposition - home    CODE STATUS:   full code       Signed By: Chela Mckeon MD     March 13, 2024

## 2024-03-14 NOTE — ED NOTES
Transportation set up with [facility] for transfer to [facility]. ETA 2200. BLS per Dr. Camarena is ok.

## 2024-03-14 NOTE — ED NOTES
Patient is ambulatory to the restroom, no syncope in ED. Patient denies CP, SOB. States feels generalized weakness time to time. Will continue to monitor.

## 2024-03-14 NOTE — PROGRESS NOTES
Orders received, chart reviewed and patient evaluated by occupational therapy. Pending progression with skilled acute occupational therapy, recommend:  No skilled occupational therapy    Recommend with nursing patient to complete as able in order to maintain strength, endurance and independence: OOB to chair 3x/day, ADLs with supervision/setup and mobilizing to the bathroom for toileting with SPV. Thank you for your assistance.     Full evaluation/discharge to follow.      Jessica Kaur, MANAN, OTR/L

## 2024-03-14 NOTE — PLAN OF CARE
- Number of Steps: 6  Entrance Stairs - Rails: Left  Bathroom Shower/Tub: Walk-in shower  Bathroom Equipment: Shower chair, Built-in shower seat  Home Equipment: Cane, Walker, rolling, Walker, standard  Has the patient had two or more falls in the past year or any fall with injury in the past year?: No  ADL Assistance: Independent  Homemaking Assistance: Independent  Ambulation Assistance: Independent  Transfer Assistance: Independent  Active : Yes  Mode of Transportation: Car  Occupation: Retired  Type of Occupation: Cattle ranch    Cognitive/Behavioral Status:  Orientation  Overall Orientation Status: Within Normal Limits  Orientation Level: Oriented X4  Cognition  Overall Cognitive Status: WNL    Skin: intact where visible    Edema: none noted    Hearing:   Hearing  Hearing: Within functional limits    Vision/Perceptual:    Vision - Basic Assessment  Prior Vision: Wears glasses all the time     Vision  Vision: Impaired  Vision Exceptions: Wears glasses at all times       Strength:    Strength: Within functional limits    Tone & Sensation:   Tone: Normal  Sensation: Intact    Coordination:  Coordination: Within functional limits    Range Of Motion:  AROM: Within functional limits       Functional Mobility:  Bed Mobility:     Bed Mobility Training  Bed Mobility Training:  (Pt recieved up in chair)  Transfers:     Transfer Training  Transfer Training: Yes  Sit to Stand: Supervision  Stand to Sit: Supervision  Balance:               Balance  Sitting: Intact  Standing: Intact  Ambulation/Gait Training:                       Gait  Gait Training: Yes  Overall Level of Assistance: Stand-by assistance  Distance (ft): 150 Feet  Assistive Device: Gait belt  Interventions: Safety awareness training  Base of Support: Narrowed  Speed/Park: Pace decreased (< 100 feet/min)  Step Length: Right shortened;Left shortened  Gait Abnormalities: Decreased step clearance                                                                                                                                                                                                                                                                   Pain Ratin/10     Pain Intervention(s):   pain is at a level acceptable to the patient    Activity Tolerance:   Good and SpO2 stable on room air    After treatment:   Patient left in no apparent distress sitting up in chair, Call bell within reach, and Caregiver / family present    COMMUNICATION/EDUCATION:   The patient's plan of care was discussed with: occupational therapist and registered nurse    Patient Education  Education Given To: Patient;Family  Education Provided: Role of Therapy;Plan of Care;Energy Conservation;Transfer Training;Fall Prevention Strategies  Education Provided Comments: activity pacing with orthostatic hypotension  Education Method: Verbal;Demonstration  Barriers to Learning: None  Education Outcome: Verbalized understanding;Demonstrated understanding    Thank you for this referral.  Jamia Lawson PT, DPT  Minutes: 31      Physical Therapy Evaluation Charge Determination   History Examination Presentation Decision-Making   MEDIUM  Complexity : 1-2 comorbidities / personal factors will impact the outcome/ POC  LOW Complexity : 1-2 Standardized tests and measures addressing body structure, function, activity limitation and / or participation in recreation  LOW Complexity : Stable, uncomplicated  AM-PAC  LOW    Based on the above components, the patient evaluation is determined to be of the following complexity level: Low

## 2024-03-14 NOTE — PROGRESS NOTES
OCCUPATIONAL THERAPY EVALUATION/DISCHARGE  Patient: Cesario Chase (83 y.o. male)  Date: 3/14/2024  Primary Diagnosis: Syncope and collapse [R55]         Precautions:                    ASSESSMENT :  Based on the objective data below, the patient admitted for suspected syncopal event. Patient has hx of MVP s/p MAZE na dhx of Afib - wearing cardiac monitor during evaluation on this date. Patient reports he has been ambulatory since admission, up to bathroom without difficulties. Able to complete bedroom/hallway mobility, educated on energy conservation techniques briefly. Mild light headedness endorsed however patients VSS and negative for orthostatic hypotension. Patient reports no concerns with being able to complete ADLs upon DC home, is at his baseline LOF. Further skilled acute occupational therapy is not indicated at this time.    Addendum: Per chart patient now to have possible PPM placed 3/15. Please reconsult OT post-procedurally if needed.     Functional Outcome Measure:  The patient scored 23/24 on the [facility] outcome measure.         PLAN :  Recommend with staff: Up w/ A x1 for IV line mgmt    Recommendation for discharge: (in order for the patient to meet his/her long term goals): No skilled occupational therapy    Other factors to consider for discharge: no additional factors    IF patient discharges home will need the following DME: none     SUBJECTIVE:   Patient stated, “I like to volunteer in [place] in my free time.”    OBJECTIVE DATA SUMMARY:     Past Medical History:   Diagnosis Date    Arthritis     Hypertension     Kidney stones      Past Surgical History:   Procedure Laterality Date    MITRAL VALVE REPLACEMENT         Prior Level of Function/Environment/Context:  , ADL Assistance: Independent,  ,  ,  ,  ,  , Homemaking Assistance: Independent, Ambulation Assistance: Independent, Transfer Assistance: Independent, Active : Yes     Expanded or extensive additional review of patient history:    Social/Functional History  Lives With: Spouse  Type of Home: House  Home Layout: Able to Live on Main level with bedroom/bathroom, Two level  Home Access: Stairs to enter with rails  Entrance Stairs - Number of Steps: 6  Entrance Stairs - Rails: Left  Bathroom Shower/Tub: Walk-in shower  Bathroom Equipment: Shower chair, Built-in shower seat  Home Equipment: Cane, Walker, rolling, Walker, standard  Has the patient had two or more falls in the past year or any fall with injury in the past year?: No  ADL Assistance: Independent  Homemaking Assistance: Independent  Ambulation Assistance: Independent  Transfer Assistance: Independent  Active : Yes  Mode of Transportation: Car  Occupation: Retired  Type of Occupation: Cattle ranch    Hand Dominance: right     EXAMINATION OF PERFORMANCE DEFICITS:    Cognitive/Behavioral Status:    Orientation  Overall Orientation Status: Within Normal Limits  Orientation Level: Oriented X4  Cognition  Overall Cognitive Status: WNL    Hearing:   Hearing  Hearing: Within functional limits    Vision/Perceptual:    Vision - Basic Assessment  Prior Vision: Wears glasses all the time     Vision  Vision: Impaired  Vision Exceptions: Wears glasses at all times       Range of Motion:   AROM: Within functional limits  PROM: Within functional limits      Strength:  Strength: Within functional limits      Coordination:  Coordination: Within functional limits            Tone & Sensation:   Tone: Normal  Sensation: Intact      Functional Mobility and Transfers for ADLs:  Bed Mobility:     Bed Mobility Training  Bed Mobility Training:  (Pt recieved up in chair)    Transfers:     Transfer Training  Transfer Training: Yes  Sit to Stand: Supervision  Stand to Sit: Supervision     Balance:   Standing: Intact  Balance  Sitting: Intact  Standing: Intact      ADL Assessment:          Feeding: Independent       Grooming: Independent; patient reports completing standing grooming without difficulties this

## 2024-03-14 NOTE — CONSULTS
[facility] CARDIOLOGY                     Cardiac EP Hospital Care Note     [x]Initial Encounter     [ ]Follow-up     Patient Name: Cesario Chase - :1940 - MRN:[id number]   Primary Cardiologist: Dr. Henry Pastor, [facility]   Consulting Cardiologist: Matthew Armando MD     Reason for encounter: syncope     HPI:       Cesario Chase is a 83 y.o. male with PMH significant for HTN, pAF, left atrial appendage excision, MVR and MAZE, gout, HLD, glaucoma, and BPH. He presented to ED for syncope. He was eating lunch at a restaurant with his family when he suddenly passed out and leaned forward into the table. He reports in lasted about 30 seconds. He is a patient of Dr. Pastor from [facility]. He is currently wearing a [employer] Holter monitor to evaluate Afib burden. We were able to obtain some of his results from his Holter monitor from the EP RN at [facility]. The monitor noted some pauses up to 4.7 seconds yesterday around the time of his syncopal episode, as well as a 9.2 second pause today at 11 am per U NP  I have strips that showed up to 6 second sinus pause 3/14/2024, not sleep hours. Labs as follows: K 4.1 Na 140 Mg 2.0 TSH 2.28 WBC 5.5 Hgb 15. Not on any AV kayode blocking agents.     I Called Dr. Pastor office to get in touch with him to discuss about the patient.     His wife has pacemaker.      Assessment and Plan     Syncope: He is a patient of Dr. Pastor at [facility] and I have discussed with him and Dr Pastor agreed that if I have recommended dual chamber pacemaker he should go ahead with me at [facility] tomorrow  He had thought about going to [facility] but there was no bed yesterday  Patient wants to proceed with pacemaker here now  He has had sick sinus syndrome with sudden long pauses and those could have caused his syncope. He is not on any AV kayode blocking agents.   - will plan for dual chamber PPM implant tomorrow afternoon.    I have discussed risks and benefits with him    PAF: history of MVR and MAZE with LOLA excision at [facility].  03/14/2024 12:42 AM   CREATININE 0.83 03/14/2024 12:42 AM   GLUCOSE 95 03/14/2024 12:42 AM   CALCIUM 8.8 03/14/2024 12:42 AM   LABGLOM >60 03/14/2024 12:42 AM        Lab Results   Component Value Date   WBC 5.5 03/13/2024   HGB 15.0 03/13/2024   HCT 44.7 03/13/2024   MCV 93.1 03/13/2024    03/13/2024     Current meds:     Current Facility-Administered Medications:   ·  allopurinol (ZYLOPRIM) tablet 300 mg, 300 mg, Oral, Daily, Chela Mckeon MD, 300 mg at 03/14/24 0807   ·  aspirin EC tablet 81 mg, 81 mg, Oral, Daily, Chela Mckeon MD, 81 mg at 03/14/24 0807   ·  latanoprost (XALATAN) 0.005 % ophthalmic solution 1 drop, 1 drop, Both Eyes, Nightly, Chela Mckeon MD   ·  pravastatin (PRAVACHOL) tablet 40 mg, 40 mg, Oral, Nightly, Chela Mckeon MD   ·  doxazosin (CARDURA) tablet 4 mg, 4 mg, Oral, Daily, Chela Mckeon MD   ·  sodium chloride flush 0.9 % injection 5-40 mL, 5-40 mL, IntraVENous, 2 times per day, Chela Mckeon MD, 10 mL at 03/14/24 0808   ·  sodium chloride flush 0.9 % injection 5-40 mL, 5-40 mL, IntraVENous, PRN, Chela Mckeon MD   ·  0.9 % sodium chloride infusion, , IntraVENous, PRN, Chela Mckeon MD   ·  potassium chloride (KLOR-CON) extended release tablet 40 mEq, 40 mEq, Oral, PRN **OR** potassium bicarb-citric acid (EFFER-K) effervescent tablet 40 mEq, 40 mEq, Oral, PRN **OR** potassium chloride 10 mEq/100 mL IVPB (Peripheral Line), 10 mEq, IntraVENous, PRN, Chela Mckeon MD   ·  magnesium sulfate 2000 mg in 50 mL IVPB premix, 2,000 mg, IntraVENous, PRN, Chela Mckeon MD   ·  enoxaparin (LOVENOX) injection 40 mg, 40 mg, SubCUTAneous, Daily, Chela Mckeon MD, 40 mg at 03/14/24 0807   ·  ondansetron (ZOFRAN-ODT) disintegrating tablet 4 mg, 4 mg, Oral, Q8H PRN **OR** ondansetron (ZOFRAN) injection 4 mg, 4 mg, IntraVENous, Q6H PRN, Chela Mckeon MD   ·  polyethylene glycol (GLYCOLAX) packet 17 g, 17 g, Oral, Daily PRN, Chela Mckeon,

## 2024-03-14 NOTE — PROGRESS NOTES
Patient is seen   We are informed by [facility] NP that he has 9 second sinus pause   I saw 6 second pause 3/13/2024 at 12 49 pm strip from [employer]    I recommended and called Dr Pastor as he asked me to about dual chamber pacemaker tomorrow if he wants to do it here    Addendum  I spoke to his cardiologist Dr Pastor and he said ok to do pacer at [facility]  Tomorrow 3 30 pm  Nurse is informed to tell patient  May have breakfast but no lunch tomorrow

## 2024-03-15 ENCOUNTER — APPOINTMENT (OUTPATIENT)
Facility: HOSPITAL | Age: 84
End: 2024-03-15
Payer: MEDICARE

## 2024-03-15 ENCOUNTER — APPOINTMENT (OUTPATIENT)
Facility: HOSPITAL | Age: 84
End: 2024-03-15
Attending: STUDENT IN AN ORGANIZED HEALTH CARE EDUCATION/TRAINING PROGRAM
Payer: MEDICARE

## 2024-03-15 VITALS
RESPIRATION RATE: 28 BRPM | TEMPERATURE: 98 F | WEIGHT: 168 LBS | OXYGEN SATURATION: 95 % | DIASTOLIC BLOOD PRESSURE: 75 MMHG | HEIGHT: 69 IN | SYSTOLIC BLOOD PRESSURE: 128 MMHG | BODY MASS INDEX: 24.88 KG/M2 | HEART RATE: 77 BPM

## 2024-03-15 LAB
ECHO AO ROOT DIAM: 3.8 CM
ECHO AO ROOT INDEX: 1.98 CM/M2
ECHO AV AREA PEAK VELOCITY: 1.6 CM2
ECHO AV AREA/BSA PEAK VELOCITY: 0.8 CM2/M2
ECHO AV PEAK GRADIENT: 11 MMHG
ECHO AV PEAK VELOCITY: 1.6 M/S
ECHO AV VELOCITY RATIO: 0.56
ECHO BSA: 1.93 M2
ECHO EST RA PRESSURE: 3 MMHG
ECHO LA DIAMETER INDEX: 2.6 CM/M2
ECHO LA DIAMETER: 5 CM
ECHO LA TO AORTIC ROOT RATIO: 1.32
ECHO LV E' LATERAL VELOCITY: 9 CM/S
ECHO LV E' SEPTAL VELOCITY: 6 CM/S
ECHO LV FRACTIONAL SHORTENING: 30 % (ref 28–44)
ECHO LV INTERNAL DIMENSION DIASTOLE INDEX: 2.29 CM/M2
ECHO LV INTERNAL DIMENSION DIASTOLIC: 4.4 CM (ref 4.2–5.9)
ECHO LV INTERNAL DIMENSION SYSTOLIC INDEX: 1.61 CM/M2
ECHO LV INTERNAL DIMENSION SYSTOLIC: 3.1 CM
ECHO LV IVSD: 1 CM (ref 0.6–1)
ECHO LV MASS 2D: 137.8 G (ref 88–224)
ECHO LV MASS INDEX 2D: 71.8 G/M2 (ref 49–115)
ECHO LV POSTERIOR WALL DIASTOLIC: 0.9 CM (ref 0.6–1)
ECHO LV RELATIVE WALL THICKNESS RATIO: 0.41
ECHO LVOT AREA: 3.1 CM2
ECHO LVOT DIAM: 2 CM
ECHO LVOT PEAK GRADIENT: 3 MMHG
ECHO LVOT PEAK VELOCITY: 0.9 M/S
ECHO MV A VELOCITY: 0.54 M/S
ECHO MV E DECELERATION TIME (DT): 225 MS
ECHO MV E VELOCITY: 1.26 M/S
ECHO MV E/A RATIO: 2.33
ECHO MV E/E' LATERAL: 14
ECHO MV E/E' RATIO (AVERAGED): 17.5
ECHO MV MAX VELOCITY: 1.4 M/S
ECHO MV MEAN GRADIENT: 2 MMHG
ECHO MV MEAN VELOCITY: 0.6 M/S
ECHO MV PEAK GRADIENT: 8 MMHG
ECHO MV PRESSURE HALF TIME (PHT): 65.3 MS
ECHO MV PRESSURE HALF TIME (PHT): 80.4 MS
ECHO MV VTI: 34.1 CM
ECHO PULMONARY ARTERY SYSTOLIC PRESSURE (PASP): 25 MMHG
ECHO PV MAX VELOCITY: 0.8 M/S
ECHO PV PEAK GRADIENT: 3 MMHG
ECHO RIGHT VENTRICULAR SYSTOLIC PRESSURE (RVSP): 25 MMHG
ECHO RV FREE WALL PEAK S': 11 CM/S
ECHO RV INTERNAL DIMENSION: 4.7 CM
ECHO RV TAPSE: 1.5 CM (ref 1.7–?)
ECHO TV REGURGITANT MAX VELOCITY: 2.35 M/S
ECHO TV REGURGITANT PEAK GRADIENT: 22 MMHG
VAS LEFT CCA DIST EDV: 13.9 CM/S
VAS LEFT CCA DIST PSV: 61 CM/S
VAS LEFT CCA PROX EDV: 11.3 CM/S
VAS LEFT CCA PROX PSV: 65.3 CM/S
VAS LEFT ECA EDV: 9.5 CM/S
VAS LEFT ECA PSV: 64.4 CM/S
VAS LEFT ICA DIST EDV: 19.7 CM/S
VAS LEFT ICA DIST PSV: 58.1 CM/S
VAS LEFT ICA MID EDV: 17.1 CM/S
VAS LEFT ICA MID PSV: 47.5 CM/S
VAS LEFT ICA PROX EDV: 11.2 CM/S
VAS LEFT ICA PROX PSV: 47.9 CM/S
VAS LEFT ICA/CCA PSV: 1 NO UNITS
VAS LEFT VERTEBRAL EDV: 13.1 CM/S
VAS LEFT VERTEBRAL PSV: 36.1 CM/S
VAS RIGHT CCA DIST EDV: 8.7 CM/S
VAS RIGHT CCA DIST PSV: 52.6 CM/S
VAS RIGHT CCA PROX EDV: 6.5 CM/S
VAS RIGHT CCA PROX PSV: 68 CM/S
VAS RIGHT ECA EDV: 5.4 CM/S
VAS RIGHT ECA PSV: 69.1 CM/S
VAS RIGHT ICA DIST EDV: 14.2 CM/S
VAS RIGHT ICA DIST PSV: 51.5 CM/S
VAS RIGHT ICA MID EDV: 17.5 CM/S
VAS RIGHT ICA MID PSV: 57 CM/S
VAS RIGHT ICA PROX EDV: 10.9 CM/S
VAS RIGHT ICA PROX PSV: 41.6 CM/S
VAS RIGHT ICA/CCA PSV: 1.1 NO UNITS
VAS RIGHT VERTEBRAL EDV: 4.3 CM/S
VAS RIGHT VERTEBRAL PSV: 33.1 CM/S

## 2024-03-15 PROCEDURE — 94761 N-INVAS EAR/PLS OXIMETRY MLT: CPT

## 2024-03-15 PROCEDURE — 99152 MOD SED SAME PHYS/QHP 5/>YRS: CPT | Performed by: INTERNAL MEDICINE

## 2024-03-15 PROCEDURE — 2580000003 HC RX 258: Performed by: STUDENT IN AN ORGANIZED HEALTH CARE EDUCATION/TRAINING PROGRAM

## 2024-03-15 PROCEDURE — C1785 PMKR, DUAL, RATE-RESP: HCPCS | Performed by: INTERNAL MEDICINE

## 2024-03-15 PROCEDURE — 93306 TTE W/DOPPLER COMPLETE: CPT | Performed by: INTERNAL MEDICINE

## 2024-03-15 PROCEDURE — 2709999900 HC NON-CHARGEABLE SUPPLY: Performed by: INTERNAL MEDICINE

## 2024-03-15 PROCEDURE — 93306 TTE W/DOPPLER COMPLETE: CPT

## 2024-03-15 PROCEDURE — 02H63JZ INSERTION OF PACEMAKER LEAD INTO RIGHT ATRIUM, PERCUTANEOUS APPROACH: ICD-10-PCS | Performed by: INTERNAL MEDICINE

## 2024-03-15 PROCEDURE — C1889 IMPLANT/INSERT DEVICE, NOC: HCPCS | Performed by: INTERNAL MEDICINE

## 2024-03-15 PROCEDURE — 2580000003 HC RX 258: Performed by: INTERNAL MEDICINE

## 2024-03-15 PROCEDURE — 2720000010 HC SURG SUPPLY STERILE: Performed by: INTERNAL MEDICINE

## 2024-03-15 PROCEDURE — 33208 INSRT HEART PM ATRIAL & VENT: CPT | Performed by: INTERNAL MEDICINE

## 2024-03-15 PROCEDURE — 6360000002 HC RX W HCPCS: Performed by: INTERNAL MEDICINE

## 2024-03-15 PROCEDURE — 2500000003 HC RX 250 WO HCPCS: Performed by: INTERNAL MEDICINE

## 2024-03-15 PROCEDURE — C1892 INTRO/SHEATH,FIXED,PEEL-AWAY: HCPCS | Performed by: INTERNAL MEDICINE

## 2024-03-15 PROCEDURE — 71045 X-RAY EXAM CHEST 1 VIEW: CPT

## 2024-03-15 PROCEDURE — 02HK3JZ INSERTION OF PACEMAKER LEAD INTO RIGHT VENTRICLE, PERCUTANEOUS APPROACH: ICD-10-PCS | Performed by: INTERNAL MEDICINE

## 2024-03-15 PROCEDURE — 0JH606Z INSERTION OF PACEMAKER, DUAL CHAMBER INTO CHEST SUBCUTANEOUS TISSUE AND FASCIA, OPEN APPROACH: ICD-10-PCS | Performed by: INTERNAL MEDICINE

## 2024-03-15 PROCEDURE — 99153 MOD SED SAME PHYS/QHP EA: CPT | Performed by: INTERNAL MEDICINE

## 2024-03-15 PROCEDURE — C1898 LEAD, PMKR, OTHER THAN TRANS: HCPCS | Performed by: INTERNAL MEDICINE

## 2024-03-15 PROCEDURE — 6370000000 HC RX 637 (ALT 250 FOR IP): Performed by: STUDENT IN AN ORGANIZED HEALTH CARE EDUCATION/TRAINING PROGRAM

## 2024-03-15 DEVICE — IPG W1DR01 AZURE XT DR MRI USA
Type: IMPLANTABLE DEVICE | Status: FUNCTIONAL
Brand: AZURE™ XT DR MRI SURESCAN™

## 2024-03-15 DEVICE — LEAD 5076-58 MRI US RCMCRD
Type: IMPLANTABLE DEVICE | Status: FUNCTIONAL
Brand: CAPSUREFIX NOVUS MRI™ SURESCAN®

## 2024-03-15 DEVICE — ENVELOPE CMRM6133 ABSORB LRG MR
Type: IMPLANTABLE DEVICE | Status: FUNCTIONAL
Brand: TYRX™

## 2024-03-15 DEVICE — LEAD 5076-52 MRI US RCMCRD
Type: IMPLANTABLE DEVICE | Status: FUNCTIONAL
Brand: CAPSUREFIX NOVUS MRI™ SURESCAN®

## 2024-03-15 RX ORDER — HYDROCODONE BITARTRATE AND ACETAMINOPHEN 5; 325 MG/1; MG/1
1 TABLET ORAL EVERY 6 HOURS PRN
Status: DISCONTINUED | OUTPATIENT
Start: 2024-03-15 | End: 2024-03-15 | Stop reason: HOSPADM

## 2024-03-15 RX ORDER — ONDANSETRON 2 MG/ML
4 INJECTION INTRAMUSCULAR; INTRAVENOUS EVERY 6 HOURS PRN
Status: DISCONTINUED | OUTPATIENT
Start: 2024-03-15 | End: 2024-03-15 | Stop reason: HOSPADM

## 2024-03-15 RX ORDER — SODIUM CHLORIDE 0.9 % (FLUSH) 0.9 %
5-40 SYRINGE (ML) INJECTION PRN
Status: DISCONTINUED | OUTPATIENT
Start: 2024-03-15 | End: 2024-03-15 | Stop reason: HOSPADM

## 2024-03-15 RX ORDER — FENTANYL CITRATE 50 UG/ML
INJECTION, SOLUTION INTRAMUSCULAR; INTRAVENOUS PRN
Status: DISCONTINUED | OUTPATIENT
Start: 2024-03-15 | End: 2024-03-15 | Stop reason: HOSPADM

## 2024-03-15 RX ORDER — MIDAZOLAM HYDROCHLORIDE 1 MG/ML
INJECTION INTRAMUSCULAR; INTRAVENOUS PRN
Status: DISCONTINUED | OUTPATIENT
Start: 2024-03-15 | End: 2024-03-15 | Stop reason: HOSPADM

## 2024-03-15 RX ORDER — SODIUM CHLORIDE 9 MG/ML
INJECTION, SOLUTION INTRAVENOUS CONTINUOUS PRN
Status: DISCONTINUED | OUTPATIENT
Start: 2024-03-15 | End: 2024-03-15 | Stop reason: HOSPADM

## 2024-03-15 RX ADMIN — DOXAZOSIN 4 MG: 2 TABLET ORAL at 10:34

## 2024-03-15 RX ADMIN — ALLOPURINOL 300 MG: 100 TABLET ORAL at 10:33

## 2024-03-15 RX ADMIN — ASPIRIN 81 MG: 81 TABLET, COATED ORAL at 10:34

## 2024-03-15 RX ADMIN — SODIUM CHLORIDE: 9 INJECTION, SOLUTION INTRAVENOUS at 03:17

## 2024-03-15 RX ADMIN — WATER 2000 MG: 1 INJECTION INTRAMUSCULAR; INTRAVENOUS; SUBCUTANEOUS at 15:38

## 2024-03-15 RX ADMIN — SODIUM CHLORIDE: 9 INJECTION, SOLUTION INTRAVENOUS at 14:22

## 2024-03-15 ASSESSMENT — PAIN SCALES - GENERAL: PAINLEVEL_OUTOF10: 0

## 2024-03-15 NOTE — PROGRESS NOTES
1200: pt developed 1st degree AVB with bradycardia. Dr Malloy notified in person. Instructed to continue to monitor. VSS at this time.     1456: patient in route to cath lab with cath lab RN.

## 2024-03-15 NOTE — DISCHARGE INSTRUCTIONS
Patient Instructions Post-Pacemaker      1.  You may shower with your Aquacel chest dressing in place.      2.  You may remove your Aquacel chest dressing in 1 week, or it can be removed in clinic at follow up if you prefer.    3.  Do not rub/massage the site.    4.  Do not drive for 3 days.    5.  Do not raise affected arm above shoulder level & avoid pushing/pulling with affected arm.  You may use the sling as provided as a reminder to limit range of motion, but please do not completely immobilize your arm.  No lifting anything >5 lbs with affected arm.    6.  Call Dr. Armando at [phone] if you experience any of the following symptoms:  Redness at the PM site  Swelling at or around the PM or in the left arm  Pain around the PM  Dizziness, lightheadedness, fainting spells  Lack of energy  Shortness of breath  Rapid heart rate  Chest or muscle twitches    7.  Follow-up with in clinic for wound & pacemaker function check     Future Appointments   Date Time Provider Department Center   3/29/2024 11:00 AM PACEMAKERGEOVANNY [facility]   3/29/2024 11:00 AM WOUND CHECKS, GEOVANNY AMADOR AMB   7/11/2024  8:00 AM Matthew Armando MD [facility]   7/11/2024  8:20 AM PACEMAKER3GEOVANNY  AMB     Pacemaker follow up can be transferred to [facility] later    Matthew [facility]ando M.D. [facility]  Electrophysiology/Cardiology  [facility]  [address]                      [address]  [place] [address]                             [facility] [address] [phone]

## 2024-03-15 NOTE — DISCHARGE SUMMARY
Discharge Summary       PATIENT ID: Cesario Chase  MRN: [id number]   YOB: 1940    DATE OF ADMISSION: 3/13/2024  1:46 PM    DATE OF DISCHARGE: 3/15/2024   PRIMARY CARE PROVIDER: Nancy Rojas MD     ATTENDING PHYSICIAN: Michael Malloy  DISCHARGING PROVIDER: Michael Malloy MD      CONSULTATIONS: IP CONSULT TO CARDIOLOGY    PROCEDURES/SURGERIES: Procedure(s):  Insert PPM dual    ADMISSION SUMMARY AND HOSPITAL COURSE:     Cesario Chase is a 83 y.o. male with pmh htn, pAFib s/p Left atrial appendage excision, hx of MVP s/p MAZE, gout, dyslipidemia, bph, glaucoma who presented to [facility] for concerns of syncope. Reports some malaise and fatigue iver the last 2 days. Was sitting at a restaurant whne he reportedly slumped over in his chair. Was breathing but semiconscious only for about 30 secs per family.     Patient was admitted for further evaluation of syncope episode.  Initial CT head was negative, EKG shows normal sinus rhythm with occasional PACs and nonspecific ST abnormality.  Echo shows normal EF and normal wall motion.  Patient was evaluated by electrophysiology on admission, reviewed his Holter monitor and noted 4.7 seconds pause which was noted around the time of patient's syncopal episode.  Patient also was reported to have 9.2-second pause per [facility] NP.  Also patient was noted to have 6-second pause on 3/14/2024 while patient being admitted here at the hospital.  Patient subsequently had permanent pacemaker placed.  Patient to follow-up with cardiology as scheduled.    DISCHARGE DIAGNOSES / PLAN:      BMI: Body mass index is 24.81 kg/m².. This patient: Has a BMI within normal limits.    PENDING TEST RESULTS:   At the time of discharge the following test results are still pending: None     ADDITIONAL CARE RECOMMENDATIONS:     Follow-up with cardiology as scheduled.     DIET: cardiac diet    ACTIVITY: activity as tolerated    EQUIPMENT needed: None    NOTIFY YOUR PHYSICIAN FOR ANY OF THE FOLLOWING:       No oral ulcers, mucosa moist, throat clear, dentition fair  Neck:               Supple, symmetrical  Lungs:             Clear to auscultation bilaterally.  No Wheezing or Rhonchi. No rales.  Chest wall:      No tenderness  No Accessory muscle use.  Heart:              Regular  rhythm,  No  murmur   No edema  Abdomen:        Soft, non-tender. Not distended.  Bowel sounds normal  Extremities:     No cyanosis.  No clubbing,                            Skin turgor normal, Capillary refill normal  Skin:                Not pale.  Not Jaundiced  No rashes   Psych:             Not anxious or agitated.  Neurologic:      Alert, moves all extremities, answers questions appropriately and responds to commands       CHRONIC MEDICAL DIAGNOSES:      Greater than 60 minutes were spent with the patient on counseling and coordination of care    Signed:   Michael Malloy MD  3/15/2024  1:34 PM

## 2024-03-15 NOTE — PROCEDURES
Cardiac Procedure Note   Patient: Cesario Chase  MRN: [id number]  SSN: [id number]   YOB: 1940 Age: 83 y.o.  Sex: male    Date of Procedure: 3/15/2024   Pre-procedure Diagnosis: syncope with sick sinus syndrome and sinus pause  Post-procedure Diagnosis: same  Procedure: Permanent dual chamber Pacemaker Insertion  :  Dr. Matthew Armando MD    Assistant(s):  None  Anesthesia: Moderate Sedation   Estimated Blood Loss: Less than 10 mL   Specimens Removed: None  Findings: left axillary vein access  RAA lead  RV distal septal lead  Complications: None   Implants [employer] dual chamber pacemaker  Signed by:  Matthew Armando MD  3/15/2024  4:57 PM

## 2024-03-15 NOTE — PROGRESS NOTES
Cardiac Cath Lab Procedure Area Arrival Note:    Cesario Chase arrived to Cardiac Cath Lab, Procedure Area. Patient identifiers verified with NAME and DATE OF BIRTH. Procedure verified with patient. Consent forms verified. Allergies verified. Patient informed of procedure and plan of care. Questions answered with review. Patient voiced understanding of procedure and plan of care.    Patient on cardiac monitor, non-invasive blood pressure, SPO2 monitor. On room air;  placed on O2 @ 3 lpm via nasal cannula.  IV of normal saline on pump at 25 ml/hr. Patient status doing well without problems. Patient is A&Ox 4. Patient reports no complaints of pain.     Patient medicated during procedure with orders obtained and verified by Dr. Armando.    Refer to patients Cardiac Cath Lab PROCEDURE REPORT for vital signs, assessment, status, and response during procedure, printed at end of case. Printed report on chart or scanned into chart.

## 2024-03-15 NOTE — PROGRESS NOTES
EP LAB to Recovery Room Report    Procedure: Dual Chamber Pacemaker Implant    MD: Prabha    Pre-procedure heart rhythm: NSR with some ectopy  Verbal Report given to Recovery Nurse on patient being transferred to Recovery Room for routine post-op. Patient stable upon transfer to .  Pt had conscious sedation.  Vitals, mental status, MAR, procedural summary discussed with recovery RN.     Conscious sedation medication given by EP RN:  Versed 4 mg  Fentanyl 50mcg    Procedural Site:  Incision site made to left chest. Closed in 2 layers with suture and Aquacel Ag.    Post-procedure heart rhythm: NSR    Pacemaker Mode set to AAIR-DDDR

## 2024-03-15 NOTE — PROGRESS NOTES
Verbal report given to Luanne RN on [facility] being transferred to [address] [place] [address] for routine progression of patient care       Report consisted of patient’s Situation, Background, Assessment and   Recommendations(SBAR).     Information from the following report(s) Procedure Summary was reviewed with the receiving nurse.    Lines:       Opportunity for questions and clarification was provided.      Patient transported with:   Monitor  Registered Nurse    Ice pack to left chest area at pacemaker site.     Sling applied to left arm when patient arrived on floor as he ambulated to the bathroom. Ice pack sent to floor to be reapplied to left chest area by floor RN.

## 2024-03-15 NOTE — PROGRESS NOTES
Physical Therapy:     Chart reviewed up to this date. Patient is planned to have pacemaker implanted this afternoon. PT will hold today and follow up for re-evaluation tomorrow.     Thank you,  Dameon Madrid, PT, DPT

## 2024-03-15 NOTE — PROGRESS NOTES
TRANSFER - IN REPORT:    Verbal report received from Zeinab DE LA TORRE on Cesario Chase  being received from Permanent Pacemaker insertion for ordered procedure. Report consisted of patient’s Situation, Background, Assessment and Recommendations(SBAR). Information from the following report(s) Procedure Summary was reviewed with the receiving clinician. Opportunity for questions and clarification was provided. Assessment completed upon patient’s arrival to Cardiac Cath Lab RECOVERY AREA and care assumed.       Cardiac Cath Lab Recovery Arrival Note:    Cesario Chase arrived to [facility] recovery area.  Patient procedure= Permanent Pacemaker insertion. Patient on cardiac monitor, non-invasive blood pressure, SPO2 monitor. On  O2 @ 2 lpm via nasal cannula.  IV  capped. Patient status doing well without problems. Patient is A&Ox 4. Patient reports no pain.    PROCEDURE SITE CHECK:    Procedure site:without any bleeding and no hematoma, no pain/discomfort reported at procedure site.     No change in patient status. Continue to monitor patient and status.    Ice pack to left chest area at pacemaker site.    1705 Patient taking liquids and food and tolerating it well with no complains of nausea.   1710 Portable chest x-ray done at bedside.

## 2024-03-15 NOTE — PROGRESS NOTES
Patient in Recovery [place] and is ready for pacemaker insertion. Left arm 22 gauge IV flushes without difficulty. Chloraprep to left chest area pre-procedure. Dinora Randolph RN, Charge had received report earlier this am from [address] [place] [address].

## 2024-03-15 NOTE — PROGRESS NOTES
Physician Progress Note      PATIENT:               MARY SALCEDO  CSN #:                  [id number]  :                       1940  ADMIT DATE:       3/13/2024 1:46 PM  DISCH DATE:  RESPONDING  PROVIDER #:        Michael Malloy MD          QUERY TEXT:    Patient admitted with Syncope. Noted to have AFib. If possible, please   document in progress notes and discharge summary after study the etiology of   the syncope:    The medical record reflects the following:  Risk Factors: PMH significant for HTN, pAF, left atrial appendage excision,   MVR and MAZE, gout, HLD, glaucoma, and BPH.    Clinical Indicators: Cardiology note: \"PAF: history of MVR and MAZE with LOLA   excision at [facility]. Recently wearing a monitor to evaluate for AF burden\" Echo   3/15 estimated EF of 60 - 65%.    Treatment: Cardiology consulted  -Echo  -EKG  -IVFs  Options provided:  -- Syncope due to: The syncope is due to, please specify etiology.  -- Other - I will add my own diagnosis  -- Disagree - Not applicable / Not valid  -- Disagree - Clinically unable to determine / Unknown  -- Refer to Clinical Documentation Reviewer    PROVIDER RESPONSE TEXT:    Syncope due to: The syncope is due to Sick sinus syndrome    Query created by: Rosalina Soto on 3/15/2024 2:51 PM      Electronically signed by:  Michael Malloy MD 3/15/2024 3:34 PM

## 2024-03-29 ENCOUNTER — PROCEDURE VISIT (OUTPATIENT)
Age: 84
End: 2024-03-29

## 2024-03-29 ENCOUNTER — NURSE ONLY (OUTPATIENT)
Age: 84
End: 2024-03-29

## 2024-03-29 DIAGNOSIS — Z95.0 CARDIAC PACEMAKER IN SITU: Primary | ICD-10-CM

## 2024-03-29 NOTE — PROGRESS NOTES
Patient presents for wound check post-device implantation. The dressing was removed prior to appointment and the site was inspected. The site appeared to be well-healing without ecchymosis/tenderness/erythema. Denies pain, fevers, discharge.         Future Appointments   Date Time Provider Department Center   7/11/2024  8:00 AM Matthew Armando MD C[facility] BS AMB   7/11/2024  8:20 AM GEOVANNY JONES         Continue follow up in device clinic as planned.

## 2024-05-15 ENCOUNTER — TELEPHONE (OUTPATIENT)
Age: 84
End: 2024-05-15

## 2024-05-15 NOTE — TELEPHONE ENCOUNTER
ID verified using two patient identifiers   Received transfer request in  for [facility] to follow HM. Confirmed pt will follow at [facility], confirmed cancellation of [facility] appts in July.       Opportunities for questions, clarifications, and concerns provided.  Pt expressed understanding.

## 2024-10-08 ENCOUNTER — APPOINTMENT (OUTPATIENT)
Facility: HOSPITAL | Age: 84
End: 2024-10-08
Payer: MEDICARE

## 2024-10-08 ENCOUNTER — HOSPITAL ENCOUNTER (EMERGENCY)
Facility: HOSPITAL | Age: 84
Discharge: HOME | End: 2024-10-08
Attending: EMERGENCY MEDICINE
Payer: MEDICARE

## 2024-10-08 VITALS
SYSTOLIC BLOOD PRESSURE: 111 MMHG | BODY MASS INDEX: 25.78 KG/M2 | WEIGHT: 174.6 LBS | DIASTOLIC BLOOD PRESSURE: 72 MMHG | HEART RATE: 77 BPM | RESPIRATION RATE: 16 BRPM | OXYGEN SATURATION: 93 % | TEMPERATURE: 97.7 F

## 2024-10-08 DIAGNOSIS — R10.9 LEFT FLANK PAIN: ICD-10-CM

## 2024-10-08 DIAGNOSIS — N20.1 URETERAL STONE: Primary | ICD-10-CM

## 2024-10-08 LAB
ANION GAP SERPL CALC-SCNC: 0 MMOL/L (ref 2–12)
APPEARANCE UR: CLEAR
BACTERIA URNS QL MICRO: NEGATIVE /HPF
BASOPHILS # BLD: 0 K/UL (ref 0–0.1)
BASOPHILS NFR BLD: 0 % (ref 0–1)
BILIRUB UR QL: NEGATIVE
BUN SERPL-MCNC: 32 MG/DL (ref 6–20)
BUN/CREAT SERPL: 28 (ref 12–20)
CALCIUM SERPL-MCNC: 9.2 MG/DL (ref 8.5–10.1)
CHLORIDE SERPL-SCNC: 111 MMOL/L (ref 97–108)
CO2 SERPL-SCNC: 25 MMOL/L (ref 21–32)
COLOR UR: ABNORMAL
CREAT SERPL-MCNC: 1.14 MG/DL (ref 0.7–1.3)
DIFFERENTIAL METHOD BLD: ABNORMAL
EOSINOPHIL # BLD: 0.2 K/UL (ref 0–0.4)
EOSINOPHIL NFR BLD: 3 % (ref 0–7)
EPITH CASTS URNS QL MICRO: ABNORMAL /LPF
ERYTHROCYTE [DISTWIDTH] IN BLOOD BY AUTOMATED COUNT: 15.1 % (ref 11.5–14.5)
GLUCOSE SERPL-MCNC: 106 MG/DL (ref 65–100)
GLUCOSE UR STRIP.AUTO-MCNC: NEGATIVE MG/DL
HCT VFR BLD AUTO: 41.8 % (ref 36.6–50.3)
HGB BLD-MCNC: 13.8 G/DL (ref 12.1–17)
HGB UR QL STRIP: ABNORMAL
HYALINE CASTS URNS QL MICRO: ABNORMAL /LPF (ref 0–5)
IMM GRANULOCYTES # BLD AUTO: 0 K/UL (ref 0–0.04)
IMM GRANULOCYTES NFR BLD AUTO: 0 % (ref 0–0.5)
KETONES UR QL STRIP.AUTO: ABNORMAL MG/DL
LEUKOCYTE ESTERASE UR QL STRIP.AUTO: NEGATIVE
LYMPHOCYTES # BLD: 0.6 K/UL (ref 0.8–3.5)
LYMPHOCYTES NFR BLD: 9 % (ref 12–49)
MCH RBC QN AUTO: 31.9 PG (ref 26–34)
MCHC RBC AUTO-ENTMCNC: 33 G/DL (ref 30–36.5)
MCV RBC AUTO: 96.5 FL (ref 80–99)
MONOCYTES # BLD: 0.4 K/UL (ref 0–1)
MONOCYTES NFR BLD: 7 % (ref 5–13)
NEUTS SEG # BLD: 5 K/UL (ref 1.8–8)
NEUTS SEG NFR BLD: 81 % (ref 32–75)
NITRITE UR QL STRIP.AUTO: NEGATIVE
NRBC # BLD: 0 K/UL (ref 0–0.01)
NRBC BLD-RTO: 0 PER 100 WBC
PH UR STRIP: 6 (ref 5–8)
PLATELET # BLD AUTO: 157 K/UL (ref 150–400)
PMV BLD AUTO: 9 FL (ref 8.9–12.9)
POTASSIUM SERPL-SCNC: 4.2 MMOL/L (ref 3.5–5.1)
PROT UR STRIP-MCNC: ABNORMAL MG/DL
RBC # BLD AUTO: 4.33 M/UL (ref 4.1–5.7)
RBC #/AREA URNS HPF: >100 /HPF (ref 0–5)
RBC MORPH BLD: ABNORMAL
SODIUM SERPL-SCNC: 136 MMOL/L (ref 136–145)
SP GR UR REFRACTOMETRY: 1.03 (ref 1–1.03)
URINE CULTURE IF INDICATED: ABNORMAL
UROBILINOGEN UR QL STRIP.AUTO: 1 EU/DL (ref 0.2–1)
WBC # BLD AUTO: 6.2 K/UL (ref 4.1–11.1)
WBC URNS QL MICRO: ABNORMAL /HPF (ref 0–4)

## 2024-10-08 PROCEDURE — 36415 COLL VENOUS BLD VENIPUNCTURE: CPT

## 2024-10-08 PROCEDURE — 74177 CT ABD & PELVIS W/CONTRAST: CPT

## 2024-10-08 PROCEDURE — 80048 BASIC METABOLIC PNL TOTAL CA: CPT

## 2024-10-08 PROCEDURE — 6360000004 HC RX CONTRAST MEDICATION: Performed by: RADIOLOGY

## 2024-10-08 PROCEDURE — 81001 URINALYSIS AUTO W/SCOPE: CPT

## 2024-10-08 PROCEDURE — 99285 EMERGENCY DEPT VISIT HI MDM: CPT

## 2024-10-08 PROCEDURE — 85025 COMPLETE CBC W/AUTO DIFF WBC: CPT

## 2024-10-08 RX ORDER — IOPAMIDOL 755 MG/ML
100 INJECTION, SOLUTION INTRAVASCULAR
Status: COMPLETED | OUTPATIENT
Start: 2024-10-08 | End: 2024-10-08

## 2024-10-08 RX ORDER — TAMSULOSIN HYDROCHLORIDE 0.4 MG/1
0.4 CAPSULE ORAL DAILY
Qty: 7 CAPSULE | Refills: 0 | Status: SHIPPED | OUTPATIENT
Start: 2024-10-08 | End: 2024-10-08 | Stop reason: SINTOL

## 2024-10-08 RX ADMIN — IOPAMIDOL 100 ML: 755 INJECTION, SOLUTION INTRAVENOUS at 10:31

## 2024-10-08 ASSESSMENT — ENCOUNTER SYMPTOMS
COUGH: 0
NAUSEA: 1
ABDOMINAL DISTENTION: 0
ANAL BLEEDING: 0
ABDOMINAL PAIN: 0
VOMITING: 0
SHORTNESS OF BREATH: 0
DIARRHEA: 0
WHEEZING: 0
BLOOD IN STOOL: 0

## 2024-10-08 ASSESSMENT — PAIN - FUNCTIONAL ASSESSMENT
PAIN_FUNCTIONAL_ASSESSMENT: PREVENTS OR INTERFERES SOME ACTIVE ACTIVITIES AND ADLS
PAIN_FUNCTIONAL_ASSESSMENT: 0-10

## 2024-10-08 ASSESSMENT — PAIN SCALES - GENERAL: PAINLEVEL_OUTOF10: 7

## 2024-10-08 ASSESSMENT — PAIN DESCRIPTION - LOCATION: LOCATION: ABDOMEN

## 2024-10-08 ASSESSMENT — PAIN DESCRIPTION - DESCRIPTORS: DESCRIPTORS: OTHER (COMMENT)

## 2024-10-08 ASSESSMENT — PAIN DESCRIPTION - ORIENTATION: ORIENTATION: LEFT

## 2024-10-08 NOTE — ED PROVIDER NOTES
[facility]P  EMERGENCY DEPARTMENT ENCOUNTER      Pt Name: Cesario Chase  MRN: [id number]  Birthdate 1940  Date of evaluation: 10/8/2024  Provider: Avtar Dill MD    CHIEF COMPLAINT       Chief Complaint   Patient presents with    Abdominal Pain         HISTORY OF PRESENT ILLNESS   (Location/Symptom, Timing/Onset, Context/Setting, Quality, Duration, Modifying Factors, Severity)  Note limiting factors.   84M w/ hx HTN, OA and kidney stones p/w 1d flank pain. Pt reports 1d left flank pain w/ nausea. No F/C, CP/SOB, vomiting or diarrhea. Also denies any urinary symptoms. Feels like prior kidney stones some of which have previously required urologic intervention.            Review of External Medical Records:     Nursing Notes were reviewed.    REVIEW OF SYSTEMS    (2-9 systems for level 4, 10 or more for level 5)     Review of Systems   Constitutional:  Negative for diaphoresis and fever.   HENT:  Negative for nosebleeds.    Eyes:  Negative for visual disturbance.   Respiratory:  Negative for cough, shortness of breath and wheezing.    Cardiovascular:  Negative for chest pain, palpitations and leg swelling.   Gastrointestinal:  Positive for nausea. Negative for abdominal distention, abdominal pain, anal bleeding, blood in stool, diarrhea and vomiting.   Endocrine: Negative for polyuria.   Genitourinary:  Positive for flank pain. Negative for difficulty urinating, dysuria and hematuria.   Musculoskeletal:  Negative for joint swelling.   Skin:  Negative for wound.   Neurological:  Negative for dizziness, syncope and light-headedness.   Hematological:  Does not bruise/bleed easily.   Psychiatric/Behavioral:  Negative for confusion.        Except as noted above the remainder of the review of systems was reviewed and negative.       PAST MEDICAL HISTORY     Past Medical History:   Diagnosis Date    Arthritis     Hypertension     Kidney stones          SURGICAL HISTORY       Past Surgical History:   Procedure

## 2024-10-08 NOTE — ED TRIAGE NOTES
Pt c/o left lower abd pain that started at 0600, stated it is a kidney stone, denies fever, denies diarrhea or constipation, +n/v, denies blood in stool, denies urinary symptoms or blood in urine, took an oxy this am

## 2024-10-08 NOTE — ED NOTES
9:57 AM  Patient with left flank pain and history of kidney stones, no dysuria, urgency or frequency, no fever or chills. Took oxycodone and ondansetron this morning prior to arrival.     History of stones, has had to have retrieval on some but some passed on their own.     PMH: OA, uses allopurinol for kidney stone preventive, BPH, HTN      I have evaluated the patient as the Provider in Rapid Medical Evaluation (RME). I have reviewed his vital signs and the triage nurse assessment. I have talked with the patient and any available family and advised that I am the provider in triage and have ordered the appropriate study to initiate their work up based on the clinical presentation during my assessment. I have advised that the patient will be accommodated in the Main ED as soon as possible. I have also requested to contact the triage nurse or myself immediately if the patient experiences any changes in their condition during this brief waiting period.  DAVONTE Zaragoza Heidi J, PA-C  10/08/24 1001

## (undated) DEVICE — LIMB HOLDER, WRIST/ANKLE: Brand: DEROYAL

## (undated) DEVICE — AGENT HEMSTAT 3GM PURIFIED PLNT STARCH PWD ABSRB ARISTA AH

## (undated) DEVICE — PADPRO DEFIBRILLATION/PACING/CARDIOVERSION/MONITORING ELECTRODES, ADULT/CHILD GREATER THAN 10 KG RADIOTRANSPARENT ELECTRODE, PHYSIO-CONTROL QUIK-COMBO (M) 60" (152 CM): Brand: PADPRO

## (undated) DEVICE — SUTURE ABSORBABLE MONOFILAMENT 2-0 CT1 12 IN UD MONOCRYL + SXMP1B412

## (undated) DEVICE — KIT INTRO 9FR L13CM DIA0.118IN SPLITTABLE HEMSTAT ROBUST

## (undated) DEVICE — PACEMAKER PACK: Brand: MEDLINE INDUSTRIES, INC.

## (undated) DEVICE — INTRODUCER SHTH L13CM OD7FR SH ORNG HUB SEAMLESS SAFSHTH

## (undated) DEVICE — 3M™ IOBAN™ 2 ANTIMICROBIAL INCISE DRAPE 6650EZ: Brand: IOBAN™ 2

## (undated) DEVICE — SUTURE STRATAFIX SPRL MCRYL + SZ 4-0 L12IN ABSRB UD PS-2 SXMP1B117

## (undated) DEVICE — DRESSING POSTOP AG PRISMASEAL 3.5X6IN

## (undated) DEVICE — ELECTRODE PT RET AD L9FT HI MOIST COND ADH HYDRGEL CORDED

## (undated) DEVICE — SUTURE ETHBND EXCEL SZ 2 L30IN NONABSORBABLE GRN L40MM V-37 MX69G